# Patient Record
(demographics unavailable — no encounter records)

---

## 2025-04-09 NOTE — PHYSICAL EXAM
[NL (40)] : plantar flexion 40 degrees [NL 30)] : inversion 30 degrees [NL (20)] : eversion 20 degrees [5___] : eversion 5[unfilled]/5 [2+] : posterior tibialis pulse: 2+ [Normal] : saphenous nerve sensation normal [] : ambulation with crutches [Left] : left ankle [There are no fractures, subluxations or dislocations. No significant abnormalities are seen] : There are no fractures, subluxations or dislocations. No significant abnormalities are seen [FreeTextEntry3] : Minimal lateral ankle swelling.  [FreeTextEntry9] :  AP, mortise and lateral xrays of the ankle were taken and reviewed today.  [TWNoteComboBox7] : dorsiflexion 5 degrees

## 2025-04-09 NOTE — HISTORY OF PRESENT ILLNESS
[de-identified] : ABDIRASHID AGARWALRIGAN a 20 year  old female here for evaluation of her . Patient states she .  Pain localized along the lateral medial dorsal plantar.   Had XR taken which showed   Denies trauma/previous injury.   No numbness/tingling.  Ice to affected area  No formal treatment to date.  WB in

## 2025-04-09 NOTE — DISCUSSION/SUMMARY
[de-identified] : Patient with a mild ankle sprain that has virtually resolved. She can slowly resume activities as tolerated.  Home stretching exercises were encouraged to maintain flexibility. Ice/nsaids can be used as needed. Patient will follow up as needed.

## 2025-04-09 NOTE — HISTORY OF PRESENT ILLNESS
[de-identified] : ABDIRASHID AGARWALRIGAN a 20 year  old female here for evaluation of her . Patient states she .  Pain localized along the lateral medial dorsal plantar.   Had XR taken which showed   Denies trauma/previous injury.   No numbness/tingling.  Ice to affected area  No formal treatment to date.  WB in

## 2025-04-09 NOTE — DISCUSSION/SUMMARY
[de-identified] : Patient with a mild ankle sprain that has virtually resolved. She can slowly resume activities as tolerated.  Home stretching exercises were encouraged to maintain flexibility. Ice/nsaids can be used as needed. Patient will follow up as needed.